# Patient Record
Sex: MALE | URBAN - METROPOLITAN AREA
[De-identification: names, ages, dates, MRNs, and addresses within clinical notes are randomized per-mention and may not be internally consistent; named-entity substitution may affect disease eponyms.]

---

## 2022-06-08 ENCOUNTER — APPOINTMENT (OUTPATIENT)
Dept: URBAN - METROPOLITAN AREA CLINIC 275 | Age: 37
Setting detail: DERMATOLOGY
End: 2022-06-08

## 2022-06-08 VITALS — WEIGHT: 255 LBS | HEIGHT: 69 IN

## 2022-06-08 DIAGNOSIS — L80 VITILIGO: ICD-10-CM

## 2022-06-08 PROCEDURE — OTHER PRESCRIPTION: OTHER

## 2022-06-08 PROCEDURE — OTHER REASSURANCE: OTHER

## 2022-06-08 PROCEDURE — OTHER PRESCRIPTION MEDICATION MANAGEMENT: OTHER

## 2022-06-08 PROCEDURE — OTHER COUNSELING: OTHER

## 2022-06-08 PROCEDURE — 99203 OFFICE O/P NEW LOW 30 MIN: CPT

## 2022-06-08 RX ORDER — FLUOCINONIDE 0.5 MG/G
CREAM TOPICAL
Qty: 60 | Refills: 1 | Status: ERX | COMMUNITY
Start: 2022-06-08

## 2022-06-08 NOTE — PROCEDURE: PRESCRIPTION MEDICATION MANAGEMENT
Render In Strict Bullet Format?: No
Detail Level: Simple
Initiate Treatment: Fluocinonide-E 0.05 % topical cream \\nQuantity: 60.0 g  Days Supply: 30\\nSig: Bid x4 weeks then decrease to QHS Mon thru Fri

## 2022-08-29 ENCOUNTER — APPOINTMENT (OUTPATIENT)
Dept: URBAN - METROPOLITAN AREA CLINIC 275 | Age: 37
Setting detail: DERMATOLOGY
End: 2022-08-30

## 2022-08-29 VITALS — HEIGHT: 69 IN | WEIGHT: 250 LBS

## 2022-08-29 DIAGNOSIS — L80 VITILIGO: ICD-10-CM

## 2022-08-29 PROCEDURE — OTHER REASSURANCE: OTHER

## 2022-08-29 PROCEDURE — 99213 OFFICE O/P EST LOW 20 MIN: CPT

## 2022-08-29 PROCEDURE — OTHER COUNSELING: OTHER

## 2022-08-29 PROCEDURE — OTHER PRESCRIPTION: OTHER

## 2022-08-29 PROCEDURE — OTHER PRESCRIPTION MEDICATION MANAGEMENT: OTHER

## 2022-08-29 RX ORDER — FLUOCINONIDE 0.5 MG/G
CREAM TOPICAL
Qty: 60 | Refills: 3 | Status: ERX

## 2022-08-29 ASSESSMENT — LOCATION ZONE DERM: LOCATION ZONE: FACE

## 2022-08-29 ASSESSMENT — LOCATION DETAILED DESCRIPTION DERM: LOCATION DETAILED: RIGHT MEDIAL MALAR CHEEK

## 2022-08-29 ASSESSMENT — LOCATION SIMPLE DESCRIPTION DERM: LOCATION SIMPLE: RIGHT CHEEK

## 2022-08-29 NOTE — PROCEDURE: PRESCRIPTION MEDICATION MANAGEMENT
Detail Level: Simple
Render In Strict Bullet Format?: No
Initiate Treatment: Fluocinonide-E 0.05 % topical cream \\nQuantity: 60.0 g  Days Supply: 30\\nSig: Apply to face. QHS Mon thru Fri